# Patient Record
Sex: FEMALE | Race: BLACK OR AFRICAN AMERICAN | URBAN - METROPOLITAN AREA
[De-identification: names, ages, dates, MRNs, and addresses within clinical notes are randomized per-mention and may not be internally consistent; named-entity substitution may affect disease eponyms.]

---

## 2017-03-10 ENCOUNTER — HOSPITAL ENCOUNTER (EMERGENCY)
Facility: CLINIC | Age: 27
Discharge: HOME | End: 2017-03-10
Attending: EMERGENCY MEDICINE | Admitting: EMERGENCY MEDICINE
Payer: OTHER MISCELLANEOUS

## 2017-03-10 ENCOUNTER — APPOINTMENT (OUTPATIENT)
Dept: GENERAL RADIOLOGY | Facility: CLINIC | Age: 27
End: 2017-03-10
Attending: EMERGENCY MEDICINE
Payer: OTHER MISCELLANEOUS

## 2017-03-10 VITALS
TEMPERATURE: 97.5 F | RESPIRATION RATE: 16 BRPM | SYSTOLIC BLOOD PRESSURE: 109 MMHG | OXYGEN SATURATION: 100 % | HEART RATE: 90 BPM | WEIGHT: 123.5 LBS | DIASTOLIC BLOOD PRESSURE: 81 MMHG

## 2017-03-10 DIAGNOSIS — M79.644 PAIN IN FINGER OF RIGHT HAND: ICD-10-CM

## 2017-03-10 DIAGNOSIS — M79.641 RIGHT HAND PAIN: ICD-10-CM

## 2017-03-10 DIAGNOSIS — M79.601 PAIN OF RIGHT UPPER EXTREMITY: ICD-10-CM

## 2017-03-10 PROCEDURE — 73140 X-RAY EXAM OF FINGER(S): CPT | Mod: RT

## 2017-03-10 PROCEDURE — 99283 EMERGENCY DEPT VISIT LOW MDM: CPT | Performed by: EMERGENCY MEDICINE

## 2017-03-10 PROCEDURE — 99283 EMERGENCY DEPT VISIT LOW MDM: CPT | Mod: Z6 | Performed by: EMERGENCY MEDICINE

## 2017-03-10 NOTE — ED PROVIDER NOTES
History     Chief Complaint   Patient presents with     Hand Injury     injuried right hand yesterday at work     HPI  Dee Samayoa is a 27 year old female who presents to the Emergency Department for evaluation of hand pain. The patient reports that about 24 hours ago while at work she accidentally slammed her right ring finger in the door. It was not initially painful but by the time she got home from work it started to become painful and swollen. No medications taken for pain.  No other injuries. No other complaints at this time.     I have reviewed the Medications, Allergies, Past Medical and Surgical History, and Social History in the [employer] system.  History reviewed. No pertinent past medical history.    History reviewed. No pertinent past surgical history.    No family history on file.    Social History   Substance Use Topics     Smoking status: Never Smoker     Smokeless tobacco: Not on file     Alcohol use No       No current facility-administered medications for this encounter.      No current outpatient prescriptions on file.        No Known Allergies   Review of Systems   Musculoskeletal:        Traumatic right ring finger pain   All other systems reviewed and are negative.      Physical Exam   BP: 109/81  Pulse: 90  Temp: 97.5  F (36.4  C)  Resp: 16  Weight: 56 kg (123 lb 8 oz)  SpO2: 100 %  Physical Exam Exam:  Constitutional: healthy, alert and no distress  Head: Normocephalic. No masses, lesions, tenderness or abnormalities  Neck: Neck supple. No adenopathy. Thyroid symmetric, normal size,, Carotids without bruits.  : Deferred  Musculoskeletal: R hand: 4th digit tender at DIP but mild swelling otherwise extremities normal- no gross deformities noted, gait normal and normal muscle tone  Skin: no suspicious lesions or rashes      ED Course     ED Course     Procedures          XR Finger Right G/E 2 Views (Preliminary result) Result time: 03/10/17 13:04:20     Preliminary result by Interface, Radiant  Ib (03/10/17 13:04:20)     Impression:     IMPRESSION: No evidence of fracture.     Narrative:     RIGHT FINGER TWO OR MORE VIEWS 3/10/2017 12:07 PM     HISTORY: Injury and pain; question fracture.     COMPARISON: None.            Labs Ordered and Resulted from Time of ED Arrival Up to the Time of Departure from the ED - No data to display    Assessments & Plan (with Medical Decision Making)   27-year-old female here with right finger pain AND tenderness.  Patient stated that she had an injury while at work yesterday where the door had crushed her finger and hand yesterday while she was opening a door.  Patient states that initially was not pain but over the course of a few hours there was no increase in pain over the 4th digit of the right hand.  Patient denies any numbness tingling or weakness.  Awaiting x-rays at this time.    X-rays show no evidence of any kind of fracture or dislocation.  Patient was placed in daniel tape splint and discharged home with conservative therapy.  Patient to return any type of worsening symptoms.    I have reviewed the nursing notes.    I have reviewed the findings, diagnosis, plan and need for follow up with the patient.    New Prescriptions    No medications on file       Final diagnoses:   Pain of right upper extremity     Evita CAMPOS, am serving as a trained medical scribe to document services personally performed by Rohan Harrell MD, based on the provider's statements to me. This document has been checked and approved by the attending provider.    Rohan CAMPOS MD was physically present and have reviewed and verified the accuracy of this note documented by Evita Gan.      3/10/2017   [facility], EMERGENCY DEPARTMENT     Rohan Harrell MD  03/10/17 4157

## 2017-03-10 NOTE — DISCHARGE INSTRUCTIONS
Self-Care for Strains and Sprains  Most minor strains and sprains can be treated with self-care. Recovering from a strain or sprain may take 6 to 8 weeks. Your self-care goal is to reduce pain and immobilize the injury to speed healing.     A sprain injures ligaments (tissue that connects bones to bones).        A strain injures muscles or tendons (tissue that connects muscles to bones).   Support the injured area  Wrapping the injured area provides support for short, necessary activities. Be careful not to wrap the area too tightly. This could cut off the blood supply.    Support a wrist, elbow, or shoulder with a sling.    Wrap an ankle or knee with an elastic bandage.    Tape a finger or toe to the one next to it.  Use cold and heat  Cold reduces swelling. Both cold and heat reduce pain. Heat should not be used in the initial treatment of the injury. When using cold or heat, always place a towel between the pack and your skin.    Apply ice or a cold pack 10 to 15 minutes every hour you re awake for the first 2 days.    After the swelling goes down, use cold or heat to control pain. Don t use heat late in the day, since it can cause swelling when you re not active.  Rest and elevate  Rest and elevation help your injury heal faster.    Raise the injured area above your heart level.    Keep the injured area from moving.    Limit the use of the joint or limb.  Use medicine    Aspirin reduces pain and swelling. (Note: Don t give aspirin to a child 18 or younger unless prescribed by the doctor.)    Aspirin substitutes, such as ibuprofen, can reduce pain. Some substitutes reduce swelling, too. Ask your pharmacist which substitutes you can use.  Call your doctor if:    The injured joint won t move, or bones make a grating sound when they move.    You can t put weight on the injured area, even after 24 hours.    The injured body part is cold, blue, or numb.    The joint or limb appears bent or crooked.    Pain increases  or doesn t improve in 4 days.    When pressing along the injured area, you notice a spot that is especially painful.     3653-8657 [employer]. [address] [place] [address], [place] [address]. All rights reserved. This information is not intended as a substitute for professional medical care. Always follow your healthcare professional's instructions.

## 2017-03-10 NOTE — ED AVS SNAPSHOT
[facility], Emergency Department    [address]    [facility] [address]    Phone:  [phone]    Fax:  [phone]                                       Dee Samayoa   MRN: [id number]    Department:  [facility], Emergency Department   Date of Visit:  3/10/2017           After Visit Summary Signature Page     I have received my discharge instructions, and my questions have been answered. I have discussed any challenges I see with this plan with the nurse or doctor.    ..........................................................................................................................................  Patient/Patient Representative Signature      ..........................................................................................................................................  Patient Representative Print Name and Relationship to Patient    ..................................................               ................................................  Date                                            Time    ..........................................................................................................................................  Reviewed by Signature/Title    ...................................................              ..............................................  Date                                                            Time

## 2017-03-10 NOTE — ED AVS SNAPSHOT
[facility], Emergency Department    [address]    [place] [address]    Phone:  [phone]    Fax:  [phone]                                       Dee Samayoa   MRN: [id number]    Department:  [facility], Emergency Department   Date of Visit:  3/10/2017           Patient Information     Date Of Birth          1990        Your diagnoses for this visit were:     Pain of right upper extremity        You were seen by Rohan Harrell MD.        Discharge Instructions         Self-Care for Strains and Sprains  Most minor strains and sprains can be treated with self-care. Recovering from a strain or sprain may take 6 to 8 weeks. Your self-care goal is to reduce pain and immobilize the injury to speed healing.     A sprain injures ligaments (tissue that connects bones to bones).        A strain injures muscles or tendons (tissue that connects muscles to bones).   Support the injured area  Wrapping the injured area provides support for short, necessary activities. Be careful not to wrap the area too tightly. This could cut off the blood supply.    Support a wrist, elbow, or shoulder with a sling.    Wrap an ankle or knee with an elastic bandage.    Tape a finger or toe to the one next to it.  Use cold and heat  Cold reduces swelling. Both cold and heat reduce pain. Heat should not be used in the initial treatment of the injury. When using cold or heat, always place a towel between the pack and your skin.    Apply ice or a cold pack 10 to 15 minutes every hour you re awake for the first 2 days.    After the swelling goes down, use cold or heat to control pain. Don t use heat late in the day, since it can cause swelling when you re not active.  Rest and elevate  Rest and elevation help your injury heal faster.    Raise the injured area above your heart level.    Keep the injured area from moving.    Limit the use of the joint or limb.  Use medicine    Aspirin reduces pain and swelling. (Note:  Don t give aspirin to a child 18 or younger unless prescribed by the doctor.)    Aspirin substitutes, such as ibuprofen, can reduce pain. Some substitutes reduce swelling, too. Ask your pharmacist which substitutes you can use.  Call your doctor if:    The injured joint won t move, or bones make a grating sound when they move.    You can t put weight on the injured area, even after 24 hours.    The injured body part is cold, blue, or numb.    The joint or limb appears bent or crooked.    Pain increases or doesn t improve in 4 days.    When pressing along the injured area, you notice a spot that is especially painful.     6847-0266 [employer]. [address] [place], [place] [address]. All rights reserved. This information is not intended as a substitute for professional medical care. Always follow your healthcare professional's instructions.          24 Hour Appointment Hotline       To make an appointment at any [facility], call [phone] ([phone]). If you don't have a family doctor or clinic, we will help you find one. [facility] clinics are conveniently located to serve the needs of you and your family.             Review of your medicines      Notice     You have not been prescribed any medications.            Procedures and tests performed during your visit     XR Finger Right G/E 2 Views      Orders Needing Specimen Collection     None      Pending Results     Date and Time Order Name Status Description    3/10/2017 1143 XR Finger Right G/E 2 Views Preliminary             Pending Culture Results     No orders found from 3/8/2017 to 3/11/2017.            Thank you for choosing [facility]       Thank you for choosing [facility] for your care. Our goal is always to provide you with excellent care. Hearing back from our patients is one way we can continue to improve our services. Please take a few minutes to complete the written survey that you may receive in the mail after you visit with  "us. Thank you!        [employer] Information     [employer] lets you send messages to your doctor, view your test results, renew your prescriptions, schedule appointments and more. To sign up, go to [email or web address] . Click on \"Log in\" on the left side of the screen, which will take you to the Welcome page. Then click on \"Sign up Now\" on the right side of the page.     You will be asked to enter the access code listed below, as well as some personal information. Please follow the directions to create your username and password.     Your access code is: [email or web address]  Expires: 2017  1:21 PM     Your access code will  in 90 days. If you need help or a new code, please call your [facility] clinic or [phone].        Care EveryWhere ID     This is your Care EveryWhere ID. This could be used by other organizations to access your [facility] medical records  [id number]        After Visit Summary       This is your record. Keep this with you and show to your community pharmacist(s) and doctor(s) at your next visit.                  "